# Patient Record
Sex: MALE | Race: WHITE
[De-identification: names, ages, dates, MRNs, and addresses within clinical notes are randomized per-mention and may not be internally consistent; named-entity substitution may affect disease eponyms.]

---

## 2021-08-15 ENCOUNTER — HOSPITAL ENCOUNTER (EMERGENCY)
Dept: HOSPITAL 76 - ED | Age: 51
Discharge: HOME | End: 2021-08-15
Payer: MEDICAID

## 2021-08-15 VITALS — DIASTOLIC BLOOD PRESSURE: 99 MMHG | SYSTOLIC BLOOD PRESSURE: 145 MMHG

## 2021-08-15 DIAGNOSIS — K76.0: Primary | ICD-10-CM

## 2021-08-15 DIAGNOSIS — E86.0: ICD-10-CM

## 2021-08-15 DIAGNOSIS — R07.89: ICD-10-CM

## 2021-08-15 LAB
ALBUMIN DIAFP-MCNC: 5.1 G/DL (ref 3.2–5.5)
ALBUMIN/GLOB SERPL: 2 {RATIO} (ref 1–2.2)
ALP SERPL-CCNC: 115 IU/L (ref 42–121)
ALT SERPL W P-5'-P-CCNC: 268 IU/L (ref 10–60)
ANION GAP SERPL CALCULATED.4IONS-SCNC: 12 MMOL/L (ref 6–13)
AST SERPL W P-5'-P-CCNC: 136 IU/L (ref 10–42)
BASOPHILS NFR BLD AUTO: 0.1 10^3/UL (ref 0–0.1)
BASOPHILS NFR BLD AUTO: 0.7 %
BILIRUB BLD-MCNC: 1 MG/DL (ref 0.2–1)
BUN SERPL-MCNC: 16 MG/DL (ref 6–20)
CALCIUM UR-MCNC: 9.8 MG/DL (ref 8.5–10.3)
CHLORIDE SERPL-SCNC: 105 MMOL/L (ref 101–111)
CO2 SERPL-SCNC: 22 MMOL/L (ref 21–32)
CREAT SERPLBLD-SCNC: 0.9 MG/DL (ref 0.6–1.2)
EOSINOPHIL # BLD AUTO: 0.2 10^3/UL (ref 0–0.7)
EOSINOPHIL NFR BLD AUTO: 2 %
ERYTHROCYTE [DISTWIDTH] IN BLOOD BY AUTOMATED COUNT: 12.2 % (ref 12–15)
GFRSERPLBLD MDRD-ARVRAT: 89 ML/MIN/{1.73_M2} (ref 89–?)
GLOBULIN SER-MCNC: 2.5 G/DL (ref 2.1–4.2)
GLUCOSE SERPL-MCNC: 115 MG/DL (ref 70–100)
HCT VFR BLD AUTO: 47.6 % (ref 42–52)
HGB UR QL STRIP: 16.7 G/DL (ref 14–18)
LIPASE SERPL-CCNC: 33 U/L (ref 22–51)
LYMPHOCYTES # SPEC AUTO: 1.9 10^3/UL (ref 1.5–3.5)
LYMPHOCYTES NFR BLD AUTO: 20.1 %
MCH RBC QN AUTO: 31 PG (ref 27–31)
MCHC RBC AUTO-ENTMCNC: 35.1 G/DL (ref 32–36)
MCV RBC AUTO: 88.5 FL (ref 80–94)
MONOCYTES # BLD AUTO: 0.6 10^3/UL (ref 0–1)
MONOCYTES NFR BLD AUTO: 6.7 %
NEUTROPHILS # BLD AUTO: 6.4 10^3/UL (ref 1.5–6.6)
NEUTROPHILS # SNV AUTO: 9.2 X10^3/UL (ref 4.8–10.8)
NEUTROPHILS NFR BLD AUTO: 70 %
NRBC # BLD AUTO: 0 /100WBC
NRBC # BLD AUTO: 0 X10^3/UL
PDW BLD AUTO: 9.8 FL (ref 7.4–11.4)
PLATELET # BLD: 252 10^3/UL (ref 130–450)
POTASSIUM SERPL-SCNC: 3.9 MMOL/L (ref 3.5–5)
PROT SPEC-MCNC: 7.6 G/DL (ref 6.7–8.2)
RBC MAR: 5.38 10^6/UL (ref 4.7–6.1)
SODIUM SERPLBLD-SCNC: 139 MMOL/L (ref 135–145)

## 2021-08-15 PROCEDURE — 99284 EMERGENCY DEPT VISIT MOD MDM: CPT

## 2021-08-15 PROCEDURE — 96361 HYDRATE IV INFUSION ADD-ON: CPT

## 2021-08-15 PROCEDURE — 93005 ELECTROCARDIOGRAM TRACING: CPT

## 2021-08-15 PROCEDURE — 96374 THER/PROPH/DIAG INJ IV PUSH: CPT

## 2021-08-15 PROCEDURE — 83690 ASSAY OF LIPASE: CPT

## 2021-08-15 PROCEDURE — 36415 COLL VENOUS BLD VENIPUNCTURE: CPT

## 2021-08-15 PROCEDURE — 80053 COMPREHEN METABOLIC PANEL: CPT

## 2021-08-15 PROCEDURE — 84484 ASSAY OF TROPONIN QUANT: CPT

## 2021-08-15 PROCEDURE — 85025 COMPLETE CBC W/AUTO DIFF WBC: CPT

## 2021-08-15 NOTE — XRAY REPORT
PROCEDURE:  Chest 1 View X-Ray

 

INDICATIONS:  Chest pain

 

TECHNIQUE:  One view of the chest was acquired.  

 

COMPARISON:  4/10/2015

 

FINDINGS:  

 

Surgical changes and devices:  None.  

 

Lungs and pleura:  No pleural effusions or pneumothorax.  Lungs are clear.  

 

Mediastinum:  Mediastinal contours appear normal.  Heart size is normal.  

 

Bones and chest wall:  No suspicious bony lesions.  Overlying soft tissues appear unremarkable.  

 

IMPRESSION:  

No acute cardiopulmonary findings

 

Reviewed by: Arthur Cadena MD on 8/15/2021 10:24 AM SERINA

Approved by: Arthur Cadena MD on 8/15/2021 10:24 AM AKCODY

 

 

Station ID:  [id number]

## 2021-08-15 NOTE — ED PHYSICIAN DOCUMENTATION
PD HPI CHEST PAIN





- Stated complaint


Stated Complaint: CHEST PX





- Chief complaint


Chief Complaint: Cardiac





- History obtained from


History obtained from: Patient, Family





- History of Present Illness


Timing - onset: How many days ago (6)


Timing - onset during: Rest


Timing - duration: Days (6)


Timing - details: Abrupt onset, Now resolved, Waxing and waning


Quality: Sharp, Pain


Location: Left chest, Upper back


Improved by: Other (ibuprofen)


Worsened by: Palpation, Position


Associated symptoms: Other (anxious).  No: Shortness of air, Diaphoresis, 

Nausea, Vomiting, Feeling faint / dizzy, General Weakness, Palpitations, Cough


Similar symptoms before: Diagnosis (costochondritis)


Recently seen: Not recently seen





- Additional information


Additional information: 





Appears well 51-year-old male with a history of anxiety and costochondritis has 

developed chest pain again.  He states that he tends to get pretty excited when 

he has pain in the left side of his chest.  First thing he talks about his a 

story when he was 10 years old of going to the hospital and having what sounds 

like a stress test done.  The patient relates that 6 days ago he began to have 

issues with pain in the left chest with episodic sharp pain and this was 

improved by the use of ibuprofen.  He does state that he usually walks 4 miles 

per day he has not had a change in his exercise tolerance.He has not been ill 

recently and has no exposure to Covid.  He is unimmunized.





Review of Systems


Constitutional: denies: Fever


Eyes: denies: Decreased vision


Ears: denies: Ear pain


Nose: denies: Congestion


Throat: denies: Sore throat


Cardiac: reports: Chest pain / pressure.  denies: Palpitations


Respiratory: denies: Dyspnea, Cough


GI: denies: Nausea, Vomiting, Diarrhea


: denies: Dysuria, Frequency





PD PAST MEDICAL HISTORY





- Past Medical History


Cardiovascular: High cholesterol





- Past Surgical History


Past Surgical History: Yes


General: Other





- Present Medications


Home Medications: 


                                Ambulatory Orders











 Medication  Instructions  Recorded  Confirmed


 


Atorvastatin [Lipitor] 10 mg PO DAILY 03/07/15 11/06/15


 


HYDROcod/ACETAM 5/325 [Norco 5/325] 1 - 2 ea PO Q6H PRN #15 tablet 11/16/15 


 


Hydrocortisone Acetate [Anusol-Hc] 25 mg RC BID PRN #20 supp.rect 12/03/15 


 


Ciprofloxacin HCl [Cipro] 500 mg PO BID #14 tablet 12/21/15 


 


metroNIDAZOLE [Flagyl] 500 mg PO TID #20 tablet 12/21/15 














- Allergies


Allergies/Adverse Reactions: 


                                    Allergies











Allergy/AdvReac Type Severity Reaction Status Date / Time


 


Penicillins Allergy Intermediate Rash Verified 08/15/21 11:02


 


buspirone HCl * [From BuSpar] AdvReac Severe Emesis Verified 08/15/21 11:02


 


cephalexin monohydrate * AdvReac Severe Emesis Verified 08/15/21 11:02





[From Keflex]     














- Social History


Does the pt smoke?: No


Smoking Status: Never smoker


Does the pt drink ETOH?: No


Does the pt have substance abuse?: No





- Immunizations


Immunizations are current?: Yes





- POLST


Patient has POLST: No





PD ED PE NORMAL





- Vitals


Vital signs reviewed: Yes (Tachycardic and hypertensive)





- General


General: Alert and oriented X 3, Well developed/nourished, Other (Anxious 

appearing male wearing 3 masks)





- HEENT


HEENT: Atraumatic, PERRL, EOMI





- Neck


Neck: Supple, no meningeal sign, No bony TTP





- Cardiac


Cardiac: RRR, No murmur





- Respiratory


Respiratory: No respiratory distress, Clear bilaterally, Other (There is chest 

wall tenderness to the lateral chest wall and to the posterior chest wall over 

the rhomboid muscles on the left side.)





- Abdomen


Abdomen: Soft, Non tender





- Back


Back: No CVA TTP, No spinal TTP





- Derm


Derm: Normal color, Warm and dry, No rash





- Extremities


Extremities: No deformity, No edema





- Neuro


Neuro: Alert and oriented X 3, CNs 2-12 intact, No motor deficit, No sensory 

deficit, Normal speech


Eye Opening: Spontaneous


Motor: Obeys Commands


Verbal: Oriented


GCS Score: 15





- Psych


Psych: Normal mood, Normal affect





Results





- Vitals


Vitals: 


                               Vital Signs - 24 hr











  08/15/21 08/15/21





  10:58 11:36


 


Temperature 36.0 C L 36.5 C


 


Heart Rate 106 H 99


 


Respiratory 21 16





Rate  


 


Blood Pressure 163/122 H 174/100 H


 


O2 Saturation 97 96








                                     Oxygen











O2 Source                      Room air

















- EKG (time done)


  ** 1054


Rate: Rate (enter#) (99)


Rhythm: NSR


Ischemia: Q waves


Other comments: Other comments (RV)


Compare to prior EKG: Unchanged from prior EKG (SPT 4- no changes)


Computer interpretation: Agree with computer





- Labs


Labs: 


                                Laboratory Tests











  08/15/21 08/15/21 08/15/21





  11:15 11:15 11:15


 


WBC  9.2  


 


RBC  5.38  


 


Hgb  16.7  


 


Hct  47.6  


 


MCV  88.5  


 


MCH  31.0  


 


MCHC  35.1  


 


RDW  12.2  


 


Plt Count  252  


 


MPV  9.8  


 


Neut # (Auto)  6.4  


 


Lymph # (Auto)  1.9  


 


Mono # (Auto)  0.6  


 


Eos # (Auto)  0.2  


 


Baso # (Auto)  0.1  


 


Absolute Nucleated RBC  0.00  


 


Nucleated RBC %  0.0  


 


Sodium   139 


 


Potassium   3.9 


 


Chloride   105 


 


Carbon Dioxide   22 


 


Anion Gap   12.0 


 


BUN   16 


 


Creatinine   0.9 


 


Estimated GFR (MDRD)   89 


 


Glucose   115 H 


 


Calcium   9.8 


 


Total Bilirubin   1.0 


 


AST   136 H 


 


ALT   268 H 


 


Alkaline Phosphatase   115 


 


Troponin I High Sens    4.1


 


Total Protein   7.6 


 


Albumin   5.1 


 


Globulin   2.5 


 


Albumin/Globulin Ratio   2.0 


 


Lipase   33 














- Rads (name of study)


  ** Chest


Radiology: Prelim report reviewed (Impression: No acute cardiopulmonary 

findings.), EMP read indepedently, See rad report





Procedures





- IVC sono (time)


  ** 1130


Bedside IVC sono: IVC measures (cm) (0.74), Dehydration (est 1-2 liter deficit)





PD MEDICAL DECISION MAKING





- ED course


Complexity details: reviewed results, re-evaluated patient, considered 

differential, d/w patient, d/w family


ED course: 





51-year-old male with chest wall pain is anxious has unchanged electrocardiogram

and he is found to be dehydrated on interrogation the inferior vena cava.  He is

administered intravenous saline and dexamethasone.





Departure





- Departure


Disposition: 01 Home, Self Care


Clinical Impression: 


 Fatty liver, Chest wall pain, Dehydration





Condition: Stable


Instructions:  ED Chest Pain Costochondritis, ED Dehydration, NAFLD


Follow-Up: 


 [facility] [Provider Group]

## 2022-10-11 ENCOUNTER — HOSPITAL ENCOUNTER (EMERGENCY)
Dept: HOSPITAL 76 - ED | Age: 52
Discharge: HOME | End: 2022-10-11
Payer: MEDICAID

## 2022-10-11 VITALS — DIASTOLIC BLOOD PRESSURE: 96 MMHG | SYSTOLIC BLOOD PRESSURE: 156 MMHG

## 2022-10-11 DIAGNOSIS — M54.41: Primary | ICD-10-CM

## 2022-10-11 PROCEDURE — 96372 THER/PROPH/DIAG INJ SC/IM: CPT

## 2022-10-11 PROCEDURE — 99283 EMERGENCY DEPT VISIT LOW MDM: CPT

## 2022-10-11 NOTE — EXTERNAL MEDICAL SUMMARY RPT
Continuity of Care Document

                           Created on:2022



Patient:ANNITA JEONG

Sex:Male

:1970

External Reference #:[id number]





Demographics







                          Address                   [address] [address]



                                                    [place] [address]

 

                          Phone                     Unavailable

 

                          Preferred Language        English

 

                          Marital Status            Never 

 

                          Catholic Affiliation     Unknown

 

                          Race                      Unknown

 

                          Ethnic Group              Unknown









Author







                          Organization              [facility]

 

                          Address                   [address] [place] [address]

 

                          Phone                     [phone]









Allergies and Intolerances







                 date            description     facility        type

 

                 (no date)       [facility]  (unknown)







Encounters

No information.



Functional Status

No information.



Immunizations

No information.



Medications







                     date                description         facility

 

                       hydrocortisone acetate 25 MG/ML / Pram

oxine  [facility]



                                        hydrochloride 10 MG/ML Rectal Cream 







Problems

No information.



Procedures







                     date                description         facility

 

                       Visit Code Hold     All

 

                       [facility]







Results/Labs







           test      date      author    facility  value     unit      interpret

ation









                                         Result panel 1









           (unknown)  (no       (unknown)  (unknown)  (no value)  (units    (unk

nown)



                    date)                                   unknown)  

 

           (unknown)  (no       (unknown)  (unknown)  (no value)  (units    (unk

nown)



                    date)                                   unknown)  

 

           (unknown)  (no       (unknown)  (unknown)  [facility]  (units  

  (unknown)



                    date)                         Medicine  unknown)  

 

           (unknown)  (no       (unknown)  (unknown)  [place]  (units    (

unknown)



                    date)                         [id number]     unknown)  

 

           (unknown)  (no       (unknown)  (unknown)  [facility]     (units    (unkno

wn)



                    date)                                   unknown)  

 

           (unknown)  (no       (unknown)  (unknown)  Family Practice  (units   

 (unknown)



                    date)                         Office Visit unknown)  

 

           (unknown)  (no       (unknown)  (unknown)  HIVES     (units    (unkno

wn)



                    date)                                   unknown)  

 

           (unknown)  (no       (unknown)  (unknown)  NAUSEA    (units    (unkno

wn)



                    date)                                   unknown)  

 

           (unknown)  (no       (unknown)  (unknown)  (no value)  (units    (unk

nown)



                    date)                                   unknown)  

 

           (unknown)  (no       (unknown)  (unknown)  22  (units    (unkno

wn)



                    date)                                   unknown)  

 

           (unknown)  (no       (unknown)  (unknown)  1313      (units    (unkno

wn)



                    date)                                   unknown)  

 

           (unknown)  (no       (unknown)  (unknown)  [id number]    (units    (unkno

wn)



                    date)                                   unknown)  

 

           (unknown)  (no       (unknown)  (unknown)  Age/Sex: 52 / M  (units   

 (unknown)



                    date)                         Date of Service: unknown)  

 

           (unknown)  (no       (unknown)  (unknown)  Allergies  (units    (unkn

own)



                    date)                                   unknown)  

 

           (unknown)  (no       (unknown)  (unknown)  Attending Dr:  (units    (

unknown)



                    date)                         Zhanna Polo unknown)  



                                                  ANGELA               

 

           (unknown)  (no       (unknown)  (unknown)  : 1970  (units   

 (unknown)



                    date)                         Acct:[id number] unknown)  

 

           (unknown)  (no       (unknown)  (unknown)  Dept at   (units    (unkno

wn)



                    date)                         [phone] unknown)  

 

           (unknown)  (no       (unknown)  (unknown)  Documented By:  (units    

(unknown)



                    date)                         Zhanna Polo unknown)  



                                                  ANGELA 22           

 

           (unknown)  (no       (unknown)  (unknown)  Family History  (units    

(unknown)



                    date)                         (Reviewed 02/15/21 unknown)  



                                                  @ 18:17 by JAN Luevano)           

 

           (unknown)  (no       (unknown)  (unknown)  Father Lipids  (units    (

unknown)



                    date)                         abnormal  unknown)  

 

           (unknown)  (no       (unknown)  (unknown)  Intake    (units    (unkno

wn)



                    date)                                   unknown)  

 

           (unknown)  (no       (unknown)  (unknown)  Intake Note:  (units    (u

nknown)



                    date)                                   unknown)  

 

           (unknown)  (no       (unknown)  (unknown)  Intake performed  (units  

  (unknown)



                    date)                         by: Zia Miner unknown)  

 

           (unknown)  (no       (unknown)  (unknown)  Intake- Clincial  (units  

  (unknown)



                    date)                         Staff     unknown)  

 

           (unknown)  (no       (unknown)  (unknown)  Loc: [facility]  (units    (unkno

wn)



                    date)                                   unknown)  

 

           (unknown)  (no       (unknown)  (unknown)  Medical History  (units   

 (unknown)



                    date)                         (Reviewed 02/15/21 unknown)  



                                                  @ 18:17 by JAN Luevano)           

 

           (unknown)  (no       (unknown)  (unknown)  No significant  (units    

(unknown)



                    date)                         medical problems unknown)  

 

           (unknown)  (no       (unknown)  (unknown)  PFSH      (units    (unkno

wn)



                    date)                                   unknown)  

 

           (unknown)  (no       (unknown)  (unknown)  Patient:  (units    (unkno

wn)



                    date)                         Annita Jeong W unknown)  



                                                  MR#: [id number]           

 

           (unknown)  (no       (unknown)  (unknown)  Pt presents with  (units  

  (unknown)



                    date)                         painful   unknown)  



                                                  hemorrhoids.           

 

           (unknown)  (no       (unknown)  (unknown)  Reason For Visit  (units  

  (unknown)



                    date)                                   unknown)  

 

           (unknown)  (no       (unknown)  (unknown)  Signed By:  (units    (unk

nown)



                    date)                                   unknown)  

 

           (unknown)  (no       (unknown)  (unknown)  Smoking Status:  (units   

 (unknown)



                    date)                         Never smoker unknown)  

 

           (unknown)  (no       (unknown)  (unknown)  This note may  (units    (

unknown)



                    date)                         have been all or unknown)  



                                                  partially           



                                                  generated using           



                                                  voice recognition           

 

           (unknown)  (no       (unknown)  (unknown)  Tobacco +  (units    (unkn

own)



                    date)                         Substance Use unknown)  

 

           (unknown)  (no       (unknown)  (unknown)  Tobacco Status  (units    

(unknown)



                    date)                                   unknown)  

 

           (unknown)  (no       (unknown)  (unknown)  Visit Reasons:  (units    

(unknown)



                    date)                         Sore hemorrhoids unknown)  

 

           (unknown)  (no       (unknown)  (unknown)  buspirone Allergy  (units 

   (unknown)



                    date)                         (Unknown, unknown)  



                                                  Unverified           



                                                  02/15/21 17:59)           

 

           (unknown)  (no       (unknown)  (unknown)  cyclobenzaprine  (units   

 (unknown)



                    date)                         Allergy (Mild, unknown)  



                                                  Unverified           



                                                  02/15/21 17:59)           

 

           (unknown)  (no       (unknown)  (unknown)  have occurred. If  (units 

   (unknown)



                    date)                         there are any unknown)  



                                                  questions, please           



                                                  contact the           



                                                  Medical Records           

 

           (unknown)  (no       (unknown)  (unknown)  may occur.  (units    (unk

nown)



                    date)                         Occasional unknown)  



                                                  wrong-word or           



                                                  'sound-alike'           



                                                  substitutions may           



                                                  have                

 

           (unknown)  (no       (unknown)  (unknown)  occurred due to  (units   

 (unknown)



                    date)                         the inherent unknown)  



                                                  limitations of           



                                                  voice recognition           



                                                  software. Please           

 

           (unknown)  (no       (unknown)  (unknown)  penicillin G  (units    (u

nknown)



                    date)                         Allergy (Mild, unknown)  



                                                  Unverified           



                                                  02/15/21 17:59)           

 

           (unknown)  (no       (unknown)  (unknown)  read the note  (units    (

unknown)



                    date)                         carefully and unknown)  



                                                  recognize, using           



                                                  context, where           



                                                  these               



                                                  substitutions           

 

           (unknown)  (no       (unknown)  (unknown)  software.  (units    (unkn

own)



                    date)                         Although every unknown)  



                                                  effort is made to           



                                                  edit content,           



                                                  transcription           



                                                  errors              









                                         Result panel 2









           (unknown)  (no       (unknown)  (unknown)  (no value)  (units    (unk

nown)



                    date)                                   unknown)  

 

           (unknown)  (no       (unknown)  (unknown)  (no value)  (units    (unk

nown)



                    date)                                   unknown)  

 

           (unknown)  (no       (unknown)  (unknown)  (no value)  (units    (unk

nown)



                    date)                                   unknown)  

 

           (unknown)  (no       (unknown)  (unknown)  22  (units    (unkno

wn)



                    date)                                   unknown)  

 

           (unknown)  (no       (unknown)  (unknown)  13:17     (units    (unkno

wn)



                    date)                                   unknown)  

 

           (unknown)  (no       (unknown)  (unknown)  [facility]  (units  

  (unknown)



                    date)                         Medicine  unknown)  

 

           (unknown)  (no       (unknown)  (unknown)  [place]  (units    (

unknown)



                    date)                         [id number]     unknown)  

 

           (unknown)  (no       (unknown)  (unknown)  [facility]     (units    (unkno

wn)



                    date)                                   unknown)  

 

           (unknown)  (no       (unknown)  (unknown)  Family Practice  (units   

 (unknown)



                    date)                         Office Visit unknown)  

 

           (unknown)  (no       (unknown)  (unknown)  HIVES     (units    (unkno

wn)



                    date)                                   unknown)  

 

           (unknown)  (no       (unknown)  (unknown)  NAUSEA    (units    (unkno

wn)



                    date)                                   unknown)  

 

           (unknown)  (no       (unknown)  (unknown)  (no value)  (units    (unk

nown)



                    date)                                   unknown)  

 

           (unknown)  (no       (unknown)  (unknown)  22  (units    (unkno

wn)



                    date)                                   unknown)  

 

           (unknown)  (no       (unknown)  (unknown)  1313      (units    (unkno

wn)



                    date)                                   unknown)  

 

           (unknown)  (no       (unknown)  (unknown)  [id number]    (units    (unkno

wn)



                    date)                                   unknown)  

 

           (unknown)  (no       (unknown)  (unknown)  Age/Sex: 52 / M  (units   

 (unknown)



                    date)                         Date of Service: unknown)  

 

           (unknown)  (no       (unknown)  (unknown)  Allergies  (units    (unkn

own)



                    date)                                   unknown)  

 

           (unknown)  (no       (unknown)  (unknown)  Attending Dr:  (units    (

unknown)



                    date)                         Zhanna Polo unknown)  



                                                  PLavonneARICHARD               

 

           (unknown)  (no       (unknown)  (unknown)  BMI 31.8  (units    (unkno

wn)



                    date)                                   unknown)  

 

           (unknown)  (no       (unknown)  (unknown)  /90  (units    (unkn

own)



                    date)                                   unknown)  

 

           (unknown)  (no       (unknown)  (unknown)  Blood Pressure  (units    

(unknown)



                    date)                         Location Lt unknown)  



                                                  brachial            

 

           (unknown)  (no       (unknown)  (unknown)  : 1970  (units   

 (unknown)



                    date)                         Acct:[id number] unknown)  

 

           (unknown)  (no       (unknown)  (unknown)  Dept at   (units    (unkno

wn)



                    date)                         [phone] unknown)  

 

           (unknown)  (no       (unknown)  (unknown)  Documented By:  (units    

(unknown)



                    date)                         Zhanna Polo unknownCecilia MILLER 22           

 

           (unknown)  (no       (unknown)  (unknown)  Family History  (units    

(unknown)



                    date)                         (Reviewed 02/15/21 unknown)  



                                                  @ 18:17 by JAN Luevano)           

 

           (unknown)  (no       (unknown)  (unknown)  Father Lipids  (units    (

unknown)



                    date)                         abnormal  unknown)  

 

           (unknown)  (no       (unknown)  (unknown)  Height 5 ft 9 in  (units  

  (unknown)



                    date)                                   unknown)  

 

           (unknown)  (no       (unknown)  (unknown)  Intake    (units    (unkno

wn)



                    date)                                   unknown)  

 

           (unknown)  (no       (unknown)  (unknown)  Intake Note:  (units    (u

nknown)



                    date)                                   unknown)  

 

           (unknown)  (no       (unknown)  (unknown)  Intake performed  (units  

  (unknown)



                    date)                         by: Zia Miner unknown)  

 

           (unknown)  (no       (unknown)  (unknown)  Intake- Clincial  (units  

  (unknown)



                    date)                         Staff     unknown)  

 

           (unknown)  (no       (unknown)  (unknown)  Loc: [facility]  (units    (unkno

wn)



                    date)                                   unknown)  

 

           (unknown)  (no       (unknown)  (unknown)  Medical History  (units   

 (unknown)



                    date)                         (Reviewed 02/15/21 unknown)  



                                                  @ 18:17 by JAN Luevano)           

 

           (unknown)  (no       (unknown)  (unknown)  Medications  (units    (un

known)



                    date)                                   unknown)  

 

           (unknown)  (no       (unknown)  (unknown)  No Known Home  (units    (

unknown)



                    date)                         Medications unknown)  



                                                  02/15/21 [History           



                                                  Confirmed           



                                                  22]           

 

           (unknown)  (no       (unknown)  (unknown)  No significant  (units    

(unknown)



                    date)                         medical problems unknown)  

 

           (unknown)  (no       (unknown)  (unknown)  Oxygen Delivery  (units   

 (unknown)



                    date)                         Method room air unknown)  

 

           (unknown)  (no       (unknown)  (unknown)  PFSH      (units    (unkno

wn)



                    date)                                   unknown)  

 

           (unknown)  (no       (unknown)  (unknown)  Patient:  (units    (unkno

wn)



                    date)                         Annita Jeong W unknown)  



                                                  MR#: [id number]           

 

           (unknown)  (no       (unknown)  (unknown)  Position Standing  (units 

   (unknown)



                    date)                                   unknown)  

 

           (unknown)  (no       (unknown)  (unknown)  Pt presents with  (units  

  (unknown)



                    date)                         painful   unknown)  



                                                  hemorrhoids for           



                                                  about 2 weeks.           

 

           (unknown)  (no       (unknown)  (unknown)  Pulse 106 H  (units    (un

known)



                    date)                                   unknown)  

 

           (unknown)  (no       (unknown)  (unknown)  Pulse Oximetry  (units    

(unknown)



                    date)                         (%) 99    unknown)  

 

           (unknown)  (no       (unknown)  (unknown)  Pulse Source  (units    (u

nknown)



                    date)                         Monitor   unknown)  

 

           (unknown)  (no       (unknown)  (unknown)  Reason For Visit  (units  

  (unknown)



                    date)                                   unknown)  

 

           (unknown)  (no       (unknown)  (unknown)  Respiration 14  (units    

(unknown)



                    date)                                   unknown)  

 

           (unknown)  (no       (unknown)  (unknown)  Signed By:  (units    (unk

nown)



                    date)                                   unknown)  

 

           (unknown)  (no       (unknown)  (unknown)  Smoking Status:  (units   

 (unknown)



                    date)                         Never smoker unknown)  

 

           (unknown)  (no       (unknown)  (unknown)  Temp 96.9 F L  (units    (

unknown)



                    date)                                   unknown)  

 

           (unknown)  (no       (unknown)  (unknown)  Temp Source Skin  (units  

  (unknown)



                    date)                                   unknown)  

 

           (unknown)  (no       (unknown)  (unknown)  This note may  (units    (

unknown)



                    date)                         have been all or unknown)  



                                                  partially           



                                                  generated using           



                                                  voice recognition           

 

           (unknown)  (no       (unknown)  (unknown)  Tobacco +  (units    (unkn

own)



                    date)                         Substance Use unknown)  

 

           (unknown)  (no       (unknown)  (unknown)  Tobacco Status  (units    

(unknown)



                    date)                                   unknown)  

 

           (unknown)  (no       (unknown)  (unknown)  Visit Reasons:  (units    

(unknown)



                    date)                         Sore hemorrhoids unknown)  

 

           (unknown)  (no       (unknown)  (unknown)  Vitals    (units    (unkno

wn)



                    date)                                   unknown)  

 

           (unknown)  (no       (unknown)  (unknown)  Weight 216 lb  (units    (

unknown)



                    date)                                   unknown)  

 

           (unknown)  (no       (unknown)  (unknown)  buspirone Allergy  (units 

   (unknown)



                    date)                         (Unknown, unknown)  



                                                  Unverified           



                                                  22 13:17)           

 

           (unknown)  (no       (unknown)  (unknown)  cyclobenzaprine  (units   

 (unknown)



                    date)                         Allergy (Mild, unknown)  



                                                  Unverified           



                                                  22 13:17)           

 

           (unknown)  (no       (unknown)  (unknown)  have occurred. If  (units 

   (unknown)



                    date)                         there are any unknown)  



                                                  questions, please           



                                                  contact the           



                                                  Medical Records           

 

           (unknown)  (no       (unknown)  (unknown)  may occur.  (units    (unk

nown)



                    date)                         Occasional unknown)  



                                                  wrong-word or           



                                                  'sound-alike'           



                                                  substitutions may           



                                                  have                

 

           (unknown)  (no       (unknown)  (unknown)  occurred due to  (units   

 (unknown)



                    date)                         the inherent unknown)  



                                                  limitations of           



                                                  voice recognition           



                                                  software. Please           

 

           (unknown)  (no       (unknown)  (unknown)  penicillin G  (units    (u

nknown)



                    date)                         Allergy (Mild, unknown)  



                                                  Unverified           



                                                  22 13:17)           

 

           (unknown)  (no       (unknown)  (unknown)  read the note  (units    (

unknown)



                    date)                         carefully and unknown)  



                                                  recognize, using           



                                                  context, where           



                                                  these               



                                                  substitutions           

 

           (unknown)  (no       (unknown)  (unknown)  software.  (units    (unkn

own)



                    date)                         Although every unknown)  



                                                  effort is made to           



                                                  edit content,           



                                                  transcription           



                                                  errors              









                                         Result panel 3









           (unknown)  (no       (unknown)  (unknown)  (no value)  (units    (unk

nown)



                    date)                                   unknown)  

 

           (unknown)  (no       (unknown)  (unknown)  Medications:  (units    (u

nknown)



                    date)                                   unknown)  

 

           (unknown)  (no       (unknown)  (unknown)  (no value)  (units    (unk

nown)



                    date)                                   unknown)  

 

           (unknown)  (no       (unknown)  (unknown)  (no value)  (units    (unk

nown)



                    date)                                   unknown)  

 

           (unknown)  (no       (unknown)  (unknown)  22  (units    (unkno

wn)



                    date)                                   unknown)  

 

           (unknown)  (no       (unknown)  (unknown)  13:17     (units    (unkno

wn)



                    date)                                   unknown)  

 

           (unknown)  (no       (unknown)  (unknown)  [facility]  (units  

  (unknown)



                    date)                         Medicine  unknown)  

 

           (unknown)  (no       (unknown)  (unknown)  [place]  (units    (

unknown)



                    date)                         [id number]     unknown)  

 

           (unknown)  (no       (unknown)  (unknown)  [employer]     (units    (unkno

wn)



                    date)                                   unknown)  

 

           (unknown)  (no       (unknown)  (unknown)  Family Practice  (units   

 (unknown)



                    date)                         Office Visit unknown)  

 

           (unknown)  (no       (unknown)  (unknown)  HIVES     (units    (unkno

wn)



                    date)                                   unknown)  

 

           (unknown)  (no       (unknown)  (unknown)  NAUSEA    (units    (unkno

wn)



                    date)                                   unknown)  

 

           (unknown)  (no       (unknown)  (unknown)  apply up to 4x  (units    

(unknown)



                    date)                         daily as needed 1 unknown)  



                                                  supp MT .prn 12 ea           



                                                  0RF                 

 

           (unknown)  (no       (unknown)  (unknown)  do not use for  (units    

(unknown)



                    date)                         more than 7 days 1 unknown)  



                                                  applic MT BID PRN           



                                                  30 grams 0RF           



                                                  hemorrhoids           

 

           (unknown)  (no       (unknown)  (unknown)  (no value)  (units    (unk

nown)



                    date)                                   unknown)  

 

           (unknown)  (no       (unknown)  (unknown)  #30 grams  (units    (unkn

own)



                    date)                         22 [Rx unknown)  



                                                  Confirmed           



                                                  22]           

 

           (unknown)  (no       (unknown)  (unknown)  22  (units    (unkno

wn)



                    date)                                   unknown)  

 

           (unknown)  (no       (unknown)  (unknown)  22 [Rx  (units    (u

nknown)



                    date)                         Confirmed unknown)  



                                                  22]           

 

           (unknown)  (no       (unknown)  (unknown)  1313      (units    (unkno

wn)



                    date)                                   unknown)  

 

           (unknown)  (no       (unknown)  (unknown)  [id number]    (units    (unkno

wn)



                    date)                                   unknown)  

 

           (unknown)  (no       (unknown)  (unknown)  Age/Sex: 52 / M  (units   

 (unknown)



                    date)                         Date of Service: unknown)  

 

           (unknown)  (no       (unknown)  (unknown)  Allergies  (units    (unkn

own)



                    date)                                   unknown)  

 

           (unknown)  (no       (unknown)  (unknown)  Assessment + Plan  (units 

   (unknown)



                    date)                                   unknown)  

 

           (unknown)  (no       (unknown)  (unknown)  Attending Dr:  (units    (

unknown)



                    date)                         Zhanna Polo unknown)  



                                                  [place]               

 

           (unknown)  (no       (unknown)  (unknown)  BMI 31.8  (units    (unkno

wn)



                    date)                                   unknown)  

 

           (unknown)  (no       (unknown)  (unknown)  /90  (units    (unkn

own)



                    date)                                   unknown)  

 

           (unknown)  (no       (unknown)  (unknown)  Blood Pressure  (units    

(unknown)



                    date)                         Location Lt unknown)  



                                                  brachial            

 

           (unknown)  (no       (unknown)  (unknown)  : 1970  (units   

 (unknown)



                    date)                         Acct:[id number] unknown)  

 

           (unknown)  (no       (unknown)  (unknown)  Dept at   (units    (unkno

wn)



                    date)                         [phone]. unknown)  

 

           (unknown)  (no       (unknown)  (unknown)  Documented By:  (units    

(unknown)



                    date)                         Zhanna Polo unknown)  



                                                  [place] 22           

 

           (unknown)  (no       (unknown)  (unknown)  Family History  (units    

(unknown)



                    date)                         (Reviewed 02/15/21 unknown)  



                                                  @ 18:17 by JAN Luevano)           

 

           (unknown)  (no       (unknown)  (unknown)  Father Lipids  (units    (

unknown)



                    date)                         abnormal  unknown)  

 

           (unknown)  (no       (unknown)  (unknown)  Height 5 ft 9 in  (units  

  (unknown)



                    date)                                   unknown)  

 

           (unknown)  (no       (unknown)  (unknown)  Intake    (units    (unkno

wn)



                    date)                                   unknown)  

 

           (unknown)  (no       (unknown)  (unknown)  Intake Note:  (units    (u

nknown)



                    date)                                   unknown)  

 

           (unknown)  (no       (unknown)  (unknown)  Intake performed  (units  

  (unknown)



                    date)                         by: Zia Miner unknown)  

 

           (unknown)  (no       (unknown)  (unknown)  Intake- Clincial  (units  

  (unknown)



                    date)                         Staff     unknown)  

 

           (unknown)  (no       (unknown)  (unknown)  Loc: [facility]  (units    (unkno

wn)



                    date)                                   unknown)  

 

           (unknown)  (no       (unknown)  (unknown)  Medical History  (units   

 (unknown)



                    date)                         (Reviewed 02/15/21 unknown)  



                                                  @ 18:17 by JAN Luevano)           

 

           (unknown)  (no       (unknown)  (unknown)  Medications  (units    (un

known)



                    date)                                   unknown)  

 

           (unknown)  (no       (unknown)  (unknown)  New       (units    (unkno

wn)



                    date)                                   unknown)  

 

           (unknown)  (no       (unknown)  (unknown)  No significant  (units    

(unknown)



                    date)                         medical problems unknown)  

 

           (unknown)  (no       (unknown)  (unknown)  Oxygen Delivery  (units   

 (unknown)



                    date)                         Method room air unknown)  

 

           (unknown)  (no       (unknown)  (unknown)  PFSH      (units    (unkno

wn)



                    date)                                   unknown)  

 

           (unknown)  (no       (unknown)  (unknown)  Patient:  (units    (unkno

wn)



                    date)                         Annita Jeong W unknown)  



                                                  MR#: [id number]           

 

           (unknown)  (no       (unknown)  (unknown)  Position Standing  (units 

   (unknown)



                    date)                                   unknown)  

 

           (unknown)  (no       (unknown)  (unknown)  Pt presents with  (units  

  (unknown)



                    date)                         painful   unknown)  



                                                  hemorrhoids for           



                                                  about 2 weeks.           

 

           (unknown)  (no       (unknown)  (unknown)  Pulse 106 H  (units    (un

known)



                    date)                                   unknown)  

 

           (unknown)  (no       (unknown)  (unknown)  Pulse Oximetry  (units    

(unknown)



                    date)                         (%) 99    unknown)  

 

           (unknown)  (no       (unknown)  (unknown)  Pulse Source  (units    (u

nknown)



                    date)                         Monitor   unknown)  

 

           (unknown)  (no       (unknown)  (unknown)  Reason For Visit  (units  

  (unknown)



                    date)                                   unknown)  

 

           (unknown)  (no       (unknown)  (unknown)  Respiration 14  (units    

(unknown)



                    date)                                   unknown)  

 

           (unknown)  (no       (unknown)  (unknown)  Signed By:  (units    (unk

nown)



                    date)                                   unknown)  

 

           (unknown)  (no       (unknown)  (unknown)  Smoking Status:  (units   

 (unknown)



                    date)                         Never smoker unknown)  

 

           (unknown)  (no       (unknown)  (unknown)  Temp 96.9 F L  (units    (

unknown)



                    date)                                   unknown)  

 

           (unknown)  (no       (unknown)  (unknown)  Temp Source Skin  (units  

  (unknown)



                    date)                                   unknown)  

 

           (unknown)  (no       (unknown)  (unknown)  This note may  (units    (

unknown)



                    date)                         have been all or unknown)  



                                                  partially           



                                                  generated using           



                                                  voice recognition           

 

           (unknown)  (no       (unknown)  (unknown)  Tobacco +  (units    (unkn

own)



                    date)                         Substance Use unknown)  

 

           (unknown)  (no       (unknown)  (unknown)  Tobacco Status  (units    

(unknown)



                    date)                                   unknown)  

 

           (unknown)  (no       (unknown)  (unknown)  Visit Reasons:  (units    

(unknown)



                    date)                         Sore hemorrhoids unknown)  

 

           (unknown)  (no       (unknown)  (unknown)  Vitals    (units    (unkno

wn)



                    date)                                   unknown)  

 

           (unknown)  (no       (unknown)  (unknown)  Weight 216 lb  (units    (

unknown)



                    date)                                   unknown)  

 

           (unknown)  (no       (unknown)  (unknown)  buspirone Allergy  (units 

   (unknown)



                    date)                         (Unknown, unknown)  



                                                  Unverified           



                                                  22 13:17)           

 

           (unknown)  (no       (unknown)  (unknown)  cyclobenzaprine  (units   

 (unknown)



                    date)                         Allergy (Mild, unknown)  



                                                  Unverified           



                                                  22 13:17)           

 

           (unknown)  (no       (unknown)  (unknown)  have occurred. If  (units 

   (unknown)



                    date)                         there are any unknown)  



                                                  questions, please           



                                                  contact the           



                                                  Medical Records           

 

           (unknown)  (no       (unknown)  (unknown)  hydrocortisone-pr  (units 

   (unknown)



                    date)                         amoxine 2.5 %-1 % unknown)  



                                                  rectal cream 1           



                                                  applic MT BID PRN           



                                                  hemorrhoids           

 

           (unknown)  (no       (unknown)  (unknown)  hydrocortisone-pr  (units 

   (unknown)



                    date)                         amoxine 2.5-1 % unknown)  

 

           (unknown)  (no       (unknown)  (unknown)  may occur.  (units    (unk

nown)



                    date)                         Occasional unknown)  



                                                  wrong-word or           



                                                  'sound-alike'           



                                                  substitutions may           



                                                  have                

 

           (unknown)  (no       (unknown)  (unknown)  occurred due to  (units   

 (unknown)



                    date)                         the inherent unknown)  



                                                  limitations of           



                                                  voice recognition           



                                                  software. Please           

 

           (unknown)  (no       (unknown)  (unknown)  penicillin G  (units    (u

nknown)



                    date)                         Allergy (Mild, unknown)  



                                                  Unverified           



                                                  22 13:17)           

 

           (unknown)  (no       (unknown)  (unknown)  phenylephrine  (units    (

unknown)



                    date)                         0.25 %-hard fat unknown)  



                                                  88.7 % rectal           



                                                  suppository 1 supp           



                                                  MT .prn #12 ea           

 

           (unknown)  (no       (unknown)  (unknown)  phenylephrine-pola  (units 

   (unknown)



                    date)                         d fat 0.25-88.7 % unknown)  

 

           (unknown)  (no       (unknown)  (unknown)  read the note  (units    (

unknown)



                    date)                         carefully and unknown)  



                                                  recognize, using           



                                                  context, where           



                                                  these               



                                                  substitutions           

 

           (unknown)  (no       (unknown)  (unknown)  software.  (units    (unkn

own)



                    date)                         Although every unknown)  



                                                  effort is made to           



                                                  edit content,           



                                                  transcription           



                                                  errors              









                                         Result panel 4









           (unknown)  (no       (unknown)  (unknown)  (no value)  (units    (unk

nown)



                    date)                                   unknown)  

 

           (unknown)  (no       (unknown)  (unknown)  Medications:  (units    (u

nknown)



                    date)                                   unknown)  

 

           (unknown)  (no       (unknown)  (unknown)  (no value)  (units    (unk

nown)



                    date)                                   unknown)  

 

           (unknown)  (no       (unknown)  (unknown)  (no value)  (units    (unk

nown)



                    date)                                   unknown)  

 

           (unknown)  (no       (unknown)  (unknown)  22  (units    (unkno

wn)



                    date)                                   unknown)  

 

           (unknown)  (no       (unknown)  (unknown)  13:17     (units    (unkno

wn)



                    date)                                   unknown)  

 

           (unknown)  (no       (unknown)  (unknown)  [facility]  (units  

  (unknown)



                    date)                         Medicine  unknown)  

 

           (unknown)  (no       (unknown)  (unknown)  [place]  (units    (

unknown)



                    date)                         [address]     unknown)  

 

           (unknown)  (no       (unknown)  (unknown)  [employer]     (units    (unkno

wn)



                    date)                                   unknown)  

 

           (unknown)  (no       (unknown)  (unknown)  Family Practice  (units   

 (unknown)



                    date)                         Office Visit unknown)  

 

           (unknown)  (no       (unknown)  (unknown)  HIVES     (units    (unkno

wn)



                    date)                                   unknown)  

 

           (unknown)  (no       (unknown)  (unknown)  NAUSEA    (units    (unkno

wn)



                    date)                                   unknown)  

 

           (unknown)  (no       (unknown)  (unknown)  apply up to 4x  (units    

(unknown)



                    date)                         daily as needed 1 unknown)  



                                                  supp MT .prn 12 ea           



                                                  0RF                 

 

           (unknown)  (no       (unknown)  (unknown)  do not use for  (units    

(unknown)



                    date)                         more than 7 days 1 unknown)  



                                                  applic MT BID PRN           



                                                  30 grams 0RF           



                                                  hemorrhoids           

 

           (unknown)  (no       (unknown)  (unknown)  (no value)  (units    (unk

nown)



                    date)                                   unknown)  

 

           (unknown)  (no       (unknown)  (unknown)  #30 grams  (units    (unkn

own)



                    date)                         22 [Rx unknown)  



                                                  Confirmed           



                                                  22]           

 

           (unknown)  (no       (unknown)  (unknown)  22  (units    (unkno

wn)



                    date)                                   unknown)  

 

           (unknown)  (no       (unknown)  (unknown)  22 [Rx  (units    (u

nknown)



                    date)                         Confirmed unknown)  



                                                  22]           

 

           (unknown)  (no       (unknown)  (unknown)  1313      (units    (unkno

wn)



                    date)                                   unknown)  

 

           (unknown)  (no       (unknown)  (unknown)  [id number]    (units    (unkno

wn)



                    date)                                   unknown)  

 

           (unknown)  (no       (unknown)  (unknown)  Age/Sex: 52 / M  (units   

 (unknown)



                    date)                         Date of Service: unknown)  

 

           (unknown)  (no       (unknown)  (unknown)  Allergies  (units    (unkn

own)



                    date)                                   unknown)  

 

           (unknown)  (no       (unknown)  (unknown)  Assessment + Plan  (units 

   (unknown)



                    date)                                   unknown)  

 

           (unknown)  (no       (unknown)  (unknown)  Attending Dr:  (units    (

unknown)



                    date)                         Zhanna Polo unknown)  



                                                  [place]               

 

           (unknown)  (no       (unknown)  (unknown)  BMI 31.8  (units    (unkno

wn)



                    date)                                   unknown)  

 

           (unknown)  (no       (unknown)  (unknown)  /90  (units    (unkn

own)



                    date)                                   unknown)  

 

           (unknown)  (no       (unknown)  (unknown)  Blood Pressure  (units    

(unknown)



                    date)                         Location Lt unknown)  



                                                  brachial            

 

           (unknown)  (no       (unknown)  (unknown)  : 1970  (units   

 (unknown)



                    date)                         Acct:[id number] unknown)  

 

           (unknown)  (no       (unknown)  (unknown)  Dept at   (units    (unkno

wn)



                    date)                         [phone]. unknown)  

 

           (unknown)  (no       (unknown)  (unknown)  Documented By:  (units    

(unknown)



                    date)                         Zhanna Polo unknown)  



                                                  [place] 22           

 

           (unknown)  (no       (unknown)  (unknown)  Family History  (units    

(unknown)



                    date)                         (Reviewed 02/15/21 unknown)  



                                                  @ 18:17 by JAN Luevano)           

 

           (unknown)  (no       (unknown)  (unknown)  Father Lipids  (units    (

unknown)



                    date)                         abnormal  unknown)  

 

           (unknown)  (no       (unknown)  (unknown)  Height 175.26 cm  (units  

  (unknown)



                    date)                                   unknown)  

 

           (unknown)  (no       (unknown)  (unknown)  Intake    (units    (unkno

wn)



                    date)                                   unknown)  

 

           (unknown)  (no       (unknown)  (unknown)  Intake Note:  (units    (u

nknown)



                    date)                                   unknown)  

 

           (unknown)  (no       (unknown)  (unknown)  Intake performed  (units  

  (unknown)



                    date)                         by: Zia Miner unknown)  

 

           (unknown)  (no       (unknown)  (unknown)  Intake- Clincial  (units  

  (unknown)



                    date)                         Staff     unknown)  

 

           (unknown)  (no       (unknown)  (unknown)  Loc: [facility]  (units    (unkno

wn)



                    date)                                   unknown)  

 

           (unknown)  (no       (unknown)  (unknown)  Medical History  (units   

 (unknown)



                    date)                         (Reviewed 02/15/21 unknown)  



                                                  @ 18:17 by JAN Luevano)           

 

           (unknown)  (no       (unknown)  (unknown)  Medications  (units    (un

known)



                    date)                                   unknown)  

 

           (unknown)  (no       (unknown)  (unknown)  New       (units    (unkno

wn)



                    date)                                   unknown)  

 

           (unknown)  (no       (unknown)  (unknown)  No significant  (units    

(unknown)



                    date)                         medical problems unknown)  

 

           (unknown)  (no       (unknown)  (unknown)  Oxygen Delivery  (units   

 (unknown)



                    date)                         Method room air unknown)  

 

           (unknown)  (no       (unknown)  (unknown)  PFSH      (units    (unkno

wn)



                    date)                                   unknown)  

 

           (unknown)  (no       (unknown)  (unknown)  Patient:  (units    (unkno

wn)



                    date)                         Annita Jeong unknown)  



                                                  MR#: [id number]           

 

           (unknown)  (no       (unknown)  (unknown)  Position Standing  (units 

   (unknown)



                    date)                                   unknown)  

 

           (unknown)  (no       (unknown)  (unknown)  Pt presents with  (units  

  (unknown)



                    date)                         painful   unknown)  



                                                  hemorrhoids for           



                                                  about 2 weeks.           

 

           (unknown)  (no       (unknown)  (unknown)  Pulse 106 H  (units    (un

known)



                    date)                                   unknown)  

 

           (unknown)  (no       (unknown)  (unknown)  Pulse Oximetry  (units    

(unknown)



                    date)                         (%) 99    unknown)  

 

           (unknown)  (no       (unknown)  (unknown)  Pulse Source  (units    (u

nknown)



                    date)                         Monitor   unknown)  

 

           (unknown)  (no       (unknown)  (unknown)  Reason For Visit  (units  

  (unknown)



                    date)                                   unknown)  

 

           (unknown)  (no       (unknown)  (unknown)  Respiration 14  (units    

(unknown)



                    date)                                   unknown)  

 

           (unknown)  (no       (unknown)  (unknown)  Signed By:  (units    (unk

nown)



                    date)                                   unknown)  

 

           (unknown)  (no       (unknown)  (unknown)  Smoking Status:  (units   

 (unknown)



                    date)                         Never smoker unknown)  

 

           (unknown)  (no       (unknown)  (unknown)  Temp 96.9 F L  (units    (

unknown)



                    date)                                   unknown)  

 

           (unknown)  (no       (unknown)  (unknown)  Temp Source Skin  (units  

  (unknown)



                    date)                                   unknown)  

 

           (unknown)  (no       (unknown)  (unknown)  This note may  (units    (

unknown)



                    date)                         have been all or unknown)  



                                                  partially           



                                                  generated using           



                                                  voice recognition           

 

           (unknown)  (no       (unknown)  (unknown)  Tobacco +  (units    (unkn

own)



                    date)                         Substance Use unknown)  

 

           (unknown)  (no       (unknown)  (unknown)  Tobacco Status  (units    

(unknown)



                    date)                                   unknown)  

 

           (unknown)  (no       (unknown)  (unknown)  Visit Reasons:  (units    

(unknown)



                    date)                         Sore hemorrhoids unknown)  

 

           (unknown)  (no       (unknown)  (unknown)  Vitals    (units    (unkno

wn)



                    date)                                   unknown)  

 

           (unknown)  (no       (unknown)  (unknown)  Weight 97.976 kg  (units  

  (unknown)



                    date)                                   unknown)  

 

           (unknown)  (no       (unknown)  (unknown)  buspirone Allergy  (units 

   (unknown)



                    date)                         (Unknown, unknown)  



                                                  Unverified           



                                                  22 13:17)           

 

           (unknown)  (no       (unknown)  (unknown)  cyclobenzaprine  (units   

 (unknown)



                    date)                         Allergy (Mild, unknown)  



                                                  Unverified           



                                                  22 13:17)           

 

           (unknown)  (no       (unknown)  (unknown)  have occurred. If  (units 

   (unknown)



                    date)                         there are any unknown)  



                                                  questions, please           



                                                  contact the           



                                                  Medical Records           

 

           (unknown)  (no       (unknown)  (unknown)  hydrocortisone-pr  (units 

   (unknown)



                    date)                         amoxine 2.5 %-1 % unknown)  



                                                  rectal cream 1           



                                                  applic MT BID PRN           



                                                  hemorrhoids           

 

           (unknown)  (no       (unknown)  (unknown)  hydrocortisone-pr  (units 

   (unknown)



                    date)                         amoxine 2.5-1 % unknown)  

 

           (unknown)  (no       (unknown)  (unknown)  may occur.  (units    (unk

nown)



                    date)                         Occasional unknown)  



                                                  wrong-word or           



                                                  'sound-alike'           



                                                  substitutions may           



                                                  have                

 

           (unknown)  (no       (unknown)  (unknown)  occurred due to  (units   

 (unknown)



                    date)                         the inherent unknown)  



                                                  limitations of           



                                                  voice recognition           



                                                  software. Please           

 

           (unknown)  (no       (unknown)  (unknown)  penicillin G  (units    (u

nknown)



                    date)                         Allergy (Mild, unknown)  



                                                  Unverified           



                                                  22 13:17)           

 

           (unknown)  (no       (unknown)  (unknown)  phenylephrine  (units    (

unknown)



                    date)                         0.25 %-hard fat unknown)  



                                                  88.7 % rectal           



                                                  suppository 1 supp           



                                                  MT .prn #12 ea           

 

           (unknown)  (no       (unknown)  (unknown)  phenylephrine-pola  (units 

   (unknown)



                    date)                         d fat 0.25-88.7 % unknown)  

 

           (unknown)  (no       (unknown)  (unknown)  read the note  (units    (

unknown)



                    date)                         carefully and unknown)  



                                                  recognize, using           



                                                  context, where           



                                                  these               



                                                  substitutions           

 

           (unknown)  (no       (unknown)  (unknown)  software.  (units    (unkn

own)



                    date)                         Although every unknown)  



                                                  effort is made to           



                                                  edit content,           



                                                  transcription           



                                                  errors              









                                         Result panel 5









           (unknown)  (no       (unknown)  (unknown)  (no value)  (units    (unk

nown)



                    date)                                   unknown)  

 

           (unknown)  (no       (unknown)  (unknown)  Medications:  (units    (u

nknown)



                    date)                                   unknown)  

 

           (unknown)  (no       (unknown)  (unknown)  Qualifiers:  (units    (un

known)



                    date)                                   unknown)  

 

           (unknown)  (no       (unknown)  (unknown)  (no value)  (units    (unk

nown)



                    date)                                   unknown)  

 

           (unknown)  (no       (unknown)  (unknown)  (no value)  (units    (unk

nown)



                    date)                                   unknown)  

 

           (unknown)  (no       (unknown)  (unknown)  22  (units    (unkno

wn)



                    date)                                   unknown)  

 

           (unknown)  (no       (unknown)  (unknown)  22 1409  (units    (

unknown)



                    date)                                   unknown)  

 

           (unknown)  (no       (unknown)  (unknown)  13:17     (units    (unkno

wn)



                    date)                                   unknown)  

 

           (unknown)  (no       (unknown)  (unknown)  [facility]  (units  

  (unknown)



                    date)                         Medicine  unknown)  

 

           (unknown)  (no       (unknown)  (unknown)  [place]  (units    (

unknown)



                    date)                         [id number]     unknown)  

 

           (unknown)  (no       (unknown)  (unknown)  Family Practice  (units   

 (unknown)



                    date)                         Office Visit unknown)  

 

           (unknown)  (no       (unknown)  (unknown)  HIVES     (units    (unkno

wn)



                    date)                                   unknown)  

 

           (unknown)  (no       (unknown)  (unknown)  Hemorrhoid type:  (units  

  (unknown)



                    date)                         unspecified unknown)  



                                                  Qualified Code(s):           



                                                  K64.9 -             



                                                  Unspecified           

 

           (unknown)  (no       (unknown)  (unknown)  NAUSEA    (units    (unkno

wn)



                    date)                                   unknown)  

 

           (unknown)  (no       (unknown)  (unknown)  Signed    (units    (unkno

wn)



                    date)                                   unknown)  

 

           (unknown)  (no       (unknown)  (unknown)  apply up to 4x  (units    

(unknown)



                    date)                         daily as needed 1 unknown)  



                                                  supp MT .prn 12 ea           



                                                  0RF                 

 

           (unknown)  (no       (unknown)  (unknown)  do not use for  (units    

(unknown)



                    date)                         more than 7 days 1 unknown)  



                                                  applic MT BID PRN           



                                                  30 grams 0RF           



                                                  hemorrhoids           

 

           (unknown)  (no       (unknown)  (unknown)  (no value)  (units    (unk

nown)



                    date)                                   unknown)  

 

           (unknown)  (no       (unknown)  (unknown)  #30 grams  (units    (unkn

own)



                    date)                         22 [Rx unknown)  



                                                  Confirmed           



                                                  22]           

 

           (unknown)  (no       (unknown)  (unknown)  (1) Hemorrhoids:  (units  

  (unknown)



                    date)                                   unknown)  

 

           (unknown)  (no       (unknown)  (unknown)  22  (units    (unkno

wn)



                    date)                                   unknown)  

 

           (unknown)  (no       (unknown)  (unknown)  22 [Rx  (units    (u

nknown)



                    date)                         Confirmed unknown)  



                                                  22]           

 

           (unknown)  (no       (unknown)  (unknown)  1313      (units    (unkno

wn)



                    date)                                   unknown)  

 

           (unknown)  (no       (unknown)  (unknown)  [id number]    (units    (unkno

wn)



                    date)                                   unknown)  

 

           (unknown)  (no       (unknown)  (unknown)  Age/Sex: 52 / M  (units   

 (unknown)



                    date)                         Date of Service: unknown)  

 

           (unknown)  (no       (unknown)  (unknown)  Allergies  (units    (unkn

own)



                    date)                                   unknown)  

 

           (unknown)  (no       (unknown)  (unknown)  Assessment + Plan  (units 

   (unknown)



                    date)                                   unknown)  

 

           (unknown)  (no       (unknown)  (unknown)  Attending Dr:  (units    (

unknown)



                    date)                         Zhanna Polo unknown)  



                                                  P.A-C               

 

           (unknown)  (no       (unknown)  (unknown)  BMI 31.8  (units    (unkno

wn)



                    date)                                   unknown)  

 

           (unknown)  (no       (unknown)  (unknown)  /90  (units    (unkn

own)



                    date)                                   unknown)  

 

           (unknown)  (no       (unknown)  (unknown)  Blood Pressure  (units    

(unknown)



                    date)                         Location Lt unknown)  



                                                  brachial            

 

           (unknown)  (no       (unknown)  (unknown)  CARDIOVASCULAR:  (units   

 (unknown)



                    date)                         Denies chest pain, unknown)  



                                                  pressure,           



                                                  palpitations, or           



                                                  edema               

 

           (unknown)  (no       (unknown)  (unknown)  CARDIOVASCULAR:  (units   

 (unknown)



                    date)                         Regular rate and unknown)  



                                                  rhythm without           



                                                  murmurs, gallops,           



                                                  or rubs.            

 

           (unknown)  (no       (unknown)  (unknown)  Chief Complaint  (units   

 (unknown)



                    date)                                   unknown)  

 

           (unknown)  (no       (unknown)  (unknown)  Chief Complaint:  (units  

  (unknown)



                    date)                         painful   unknown)  



                                                  hemorrhoids           

 

           (unknown)  (no       (unknown)  (unknown)  : 1970  (units   

 (unknown)



                    date)                         Acct:[id number] unknown)  

 

           (unknown)  (no       (unknown)  (unknown)  Dept at   (units    (unkno

wn)



                    date)                         [phone]. unknown)  

 

           (unknown)  (no       (unknown)  (unknown)  Details:  (units    (unkno

wn)



                    date)                                   unknown)  

 

           (unknown)  (no       (unknown)  (unknown)  Documented By:  (units    

(unknown)



                    date)                         Zhanna Polo unknown)  



                                                  ANGELA 22           

 

           (unknown)  (no       (unknown)  (unknown)  Exam      (units    (unkno

wn)



                    date)                                   unknown)  

 

           (unknown)  (no       (unknown)  (unknown)  Exam Narrative  (units    

(unknown)



                    date)                                   unknown)  

 

           (unknown)  (no       (unknown)  (unknown)  Exam Narrative:  (units   

 (unknown)



                    date)                                   unknown)  

 

           (unknown)  (no       (unknown)  (unknown)  Family History  (units    

(unknown)



                    date)                         (Reviewed 22 unknown)  



                                                  @ 14:05 by Zhanna Polo PA-C)           

 

           (unknown)  (no       (unknown)  (unknown)  Father Lipids  (units    (

unknown)



                    date)                         abnormal  unknown)  

 

           (unknown)  (no       (unknown)  (unknown)  Findings and  (units    (u

nknown)



                    date)                         clinic/ER return unknown)  



                                                  precautions           



                                                  discussed with           



                                                  patient/family who           

 

           (unknown)  (no       (unknown)  (unknown)  GASTROINTESTINAL:  (units 

   (unknown)



                    date)                         See HPI   unknown)  

 

           (unknown)  (no       (unknown)  (unknown)  GASTROINTESTINAL:  (units 

   (unknown)



                    date)                         External  unknown)  



                                                  hemorrhoids           



                                                  visible. Internal           



                                                  hemorrhoids           



                                                  palpated            

 

           (unknown)  (no       (unknown)  (unknown)  GENERAL:  (units    (unkno

wn)



                    date)                         Well-developed unknown)  



                                                  patient, in mild           



                                                  distress.           

 

           (unknown)  (no       (unknown)  (unknown)  GENERAL: Denies  (units   

 (unknown)



                    date)                         fatigue, fever, or unknown)  



                                                  chills              

 

           (unknown)  (no       (unknown)  (unknown)  HEENT: Denies ear  (units 

   (unknown)



                    date)                         pain, vision unknown)  



                                                  changes, sore           



                                                  throat, or           



                                                  difficulty           



                                                  swallowing           

 

           (unknown)  (no       (unknown)  (unknown)  HEENT: PERRLA.  (units    

(unknown)



                    date)                         Airway patent. unknown)  

 

           (unknown)  (no       (unknown)  (unknown)  HPI       (units    (unkno

wn)



                    date)                                   unknown)  

 

           (unknown)  (no       (unknown)  (unknown)  HPI and exam  (units    (u

nknown)



                    date)                         suggestive unknown)  



                                                  hemorrhoids.           



                                                  Rectal suppository           



                                                  prescribed as well           



                                                  as                  

 

           (unknown)  (no       (unknown)  (unknown)  He also recently  (units  

  (unknown)



                    date)                         just bought a unknown)  



                                                  donut seat pillow           



                                                  for his car.           

 

           (unknown)  (no       (unknown)  (unknown)  Height 175.26 cm  (units  

  (unknown)



                    date)                                   unknown)  

 

           (unknown)  (no       (unknown)  (unknown)  Intake    (units    (unkno

wn)



                    date)                                   unknown)  

 

           (unknown)  (no       (unknown)  (unknown)  Intake Note:  (units    (u

nknown)



                    date)                                   unknown)  

 

           (unknown)  (no       (unknown)  (unknown)  Intake performed  (units  

  (unknown)



                    date)                         by: Zia Miner unknown)  

 

           (unknown)  (no       (unknown)  (unknown)  Intake- Clincial  (units  

  (unknown)



                    date)                         Staff     unknown)  

 

           (unknown)  (no       (unknown)  (unknown)  Loc: [facility]  (units    (unkno

wn)



                    date)                                   unknown)  

 

           (unknown)  (no       (unknown)  (unknown)  Medical History  (units   

 (unknown)



                    date)                         (Reviewed 22 unknown)  



                                                  @ 14:05 by Zhanna Polo PA-C)           

 

           (unknown)  (no       (unknown)  (unknown)  Medications  (units    (un

known)



                    date)                                   unknown)  

 

           (unknown)  (no       (unknown)  (unknown)  New       (units    (unkno

wn)



                    date)                                   unknown)  

 

           (unknown)  (no       (unknown)  (unknown)  No significant  (units    

(unknown)



                    date)                         medical problems unknown)  

 

           (unknown)  (no       (unknown)  (unknown)  Oxygen Delivery  (units   

 (unknown)



                    date)                         Method room air unknown)  

 

           (unknown)  (no       (unknown)  (unknown)  PFSH      (units    (unkno

wn)



                    date)                                   unknown)  

 

           (unknown)  (no       (unknown)  (unknown)  Patient is a  (units    (u

nknown)



                    date)                         52-year-old male unknown)  



                                                  presenting with           



                                                  complaints of           



                                                  painful             



                                                  hemorrhoids           

 

           (unknown)  (no       (unknown)  (unknown)  Patient:  (units    (unkno

wn)



                    date)                         Annita Jeong unknown)  



                                                  MR#: [id number]           

 

           (unknown)  (no       (unknown)  (unknown)  Plan      (units    (unkno

wn)



                    date)                                   unknown)  

 

           (unknown)  (no       (unknown)  (unknown)  Position Standing  (units 

   (unknown)



                    date)                                   unknown)  

 

           (unknown)  (no       (unknown)  (unknown)  Pt presents with  (units  

  (unknown)



                    date)                         painful   unknown)  



                                                  hemorrhoids for           



                                                  about 2 weeks.           

 

           (unknown)  (no       (unknown)  (unknown)  Pulse 106 H  (units    (un

known)



                    date)                                   unknown)  

 

           (unknown)  (no       (unknown)  (unknown)  Pulse Oximetry  (units    

(unknown)



                    date)                         (%) 99    unknown)  

 

           (unknown)  (no       (unknown)  (unknown)  Pulse Source  (units    (u

nknown)



                    date)                         Monitor   unknown)  

 

           (unknown)  (no       (unknown)  (unknown) RESPIRATORY: Clear  (units 

   (unknown)



                    date)                         to auscultation. unknown)  



                                                  Breath sounds           



                                                  equal bilaterally.           



                                                  No wheezes,           

 

           (unknown)  (no       (unknown)  (unknown)  RESPIRATORY:  (units    (u

nknown)



                    date)                         Denies shortness unknown)  



                                                  of breath, cough,           



                                                  or wheezing           

 

           (unknown)  (no       (unknown)  (unknown)  ROS       (units    (unkno

wn)



                    date)                                   unknown)  

 

           (unknown)  (no       (unknown)  (unknown)  ROS Narrative  (units    (

unknown)



                    date)                                   unknown)  

 

           (unknown)  (no       (unknown)  (unknown)  ROS Narrative:  (units    

(unknown)



                    date)                                   unknown)  

 

           (unknown)  (no       (unknown)  (unknown)  Reason For Visit  (units  

  (unknown)



                    date)                                   unknown)  

 

           (unknown)  (no       (unknown)  (unknown)  Respiration 14  (units    

(unknown)



                    date)                                   unknown)  

 

           (unknown)  (no       (unknown)  (unknown)  Signed By:  (units    (unk

nown)



                    date)                         <Electronically unknown)  



                                                  signed by Zhanna Polo>           

 

           (unknown)  (no       (unknown)  (unknown)  Smoking Status:  (units   

 (unknown)



                    date)                         Never smoker unknown)  

 

           (unknown)  (no       (unknown)  (unknown)  Temp 96.9 F L  (units    (

unknown)



                    date)                                   unknown)  

 

           (unknown)  (no       (unknown)  (unknown)  Temp Source Skin  (units  

  (unknown)



                    date)                                   unknown)  

 

           (unknown)  (no       (unknown)  (unknown)  This note may  (units    (

unknown)



                    date)                         have been all or unknown)  



                                                  partially           



                                                  generated using           



                                                  voice recognition           

 

           (unknown)  (no       (unknown)  (unknown)  Tobacco +  (units    (unkn

own)



                    date)                         Substance Use unknown)  

 

           (unknown)  (no       (unknown)  (unknown)  Tobacco Status  (units    

(unknown)



                    date)                                   unknown)  

 

           (unknown)  (no       (unknown)  (unknown)  Visit Reasons:  (units    

(unknown)



                    date)                         Sore hemorrhoids unknown)  

 

           (unknown)  (no       (unknown)  (unknown)  Vitals    (units    (unkno

wn)



                    date)                                   unknown)  

 

           (unknown)  (no       (unknown)  (unknown)  Weight 97.976 kg  (units  

  (unknown)



                    date)                                   unknown)  

 

           (unknown)  (no       (unknown)  (unknown)  and limiting  (units    (u

nknown)



                    date)                         abdominal unknown)  



                                                  straining whenever           



                                                  possible. Follow           



                                                  up with primary if           

 

           (unknown)  (no       (unknown)  (unknown)  applying topical  (units  

  (unknown)



                    date)                         preparation H with unknown)  



                                                  limited relief. He           



                                                  has been taking           



                                                  MiraLax             

 

           (unknown)  (no       (unknown)  (unknown)  buspirone Allergy  (units 

   (unknown)



                    date)                         (Unknown, unknown)  



                                                  Unverified           



                                                  22 13:17)           

 

           (unknown)  (no       (unknown)  (unknown)  cyclobenzaprine  (units   

 (unknown)



                    date)                         Allergy (Mild, unknown)  



                                                  Unverified           



                                                  22 13:17)           

 

           (unknown)  (no       (unknown)  (unknown)  daily and reports  (units 

   (unknown)



                    date)                         his stools are unknown)  



                                                  increasingly           



                                                  softer and less           



                                                  painful to pass.           

 

           (unknown)  (no       (unknown)  (unknown)  days straight.  (units    

(unknown)



                    date)                         Encouraged unknown)  



                                                  continuing           



                                                  MiraLax, utilizing           



                                                  his donut seat           



                                                  pillow,             

 

           (unknown)  (no       (unknown)  (unknown)  food delivery and  (units 

   (unknown)



                    date)                         reports   unknown)  



                                                  hemorrhoids are           



                                                  worse after being           



                                                  seated in the car           

 

           (unknown)  (no       (unknown)  (unknown)  for long periods  (units  

  (unknown)



                    date)                         of time. He unknown)  



                                                  additionally           



                                                  reports occasional           



                                                  blood on his           

 

           (unknown)  (no       (unknown)  (unknown)  for the past 2  (units    

(unknown)



                    date)                         weeks. He reports unknown)  



                                                  a chronic history           



                                                  of hemorrhoids. He           



                                                  does                

 

           (unknown)  (no       (unknown)  (unknown)  have occurred. If  (units 

   (unknown)



                    date)                         there are any unknown)  



                                                  questions, please           



                                                  contact the           



                                                  Medical Records           

 

           (unknown)  (no       (unknown)  (unknown)  hemorrhoids  (units    (un

known)



                    date)                                   unknown)  

 

           (unknown)  (no       (unknown)  (unknown)  hydrocortisone-pr  (units 

   (unknown)



                    date)                         amoxine 2.5 %-1 % unknown)  



                                                  rectal cream 1           



                                                  applic MT BID PRN           



                                                  hemorrhoids           

 

           (unknown)  (no       (unknown)  (unknown)  hydrocortisone-pr  (units 

   (unknown)



                    date)                         amoxine 2.5-1 % unknown)  

 

           (unknown)  (no       (unknown)  (unknown)  may occur.  (units    (unk

nown)



                    date)                         Occasional unknown)  



                                                  wrong-word or           



                                                  'sound-alike'           



                                                  substitutions may           



                                                  have                

 

           (unknown)  (no       (unknown)  (unknown)  occurred due to  (units   

 (unknown)



                    date)                         the inherent unknown)  



                                                  limitations of           



                                                  voice recognition           



                                                  software. Please           

 

           (unknown)  (no       (unknown)  (unknown)  on HAY. No sign  (units   

 (unknown)



                    date)                         of thrombosed or unknown)  



                                                  prolapsed           



                                                  hemorrhoids.           

 

           (unknown)  (no       (unknown)  (unknown)  penicillin G  (units    (u

nknown)



                    date)                         Allergy (Mild, unknown)  



                                                  Unverified           



                                                  22 13:17)           

 

           (unknown)  (no       (unknown)  (unknown)  phenylephrine  (units    (

unknown)



                    date)                         0.25 %-hard fat unknown)  



                                                  88.7 % rectal           



                                                  suppository 1 supp           



                                                  MT .prn #12 ea           

 

           (unknown)  (no       (unknown)  (unknown)  phenylephrine-pola  (units 

   (unknown)



                    date)                         d fat 0.25-88.7 % unknown)  

 

           (unknown)  (no       (unknown)  (unknown)  rales, or  (units    (unkn

own)



                    date)                         rhonchi.  unknown)  

 

           (unknown)  (no       (unknown)  (unknown)  read the note  (units    (

unknown)



                    date)                         carefully and unknown)  



                                                  recognize, using           



                                                  context, where           



                                                  these               



                                                  substitutions           

 

           (unknown)  (no       (unknown)  (unknown)  software.  (units    (unkn

own)



                    date)                         Although every unknown)  



                                                  effort is made to           



                                                  edit content,           



                                                  transcription           



                                                  errors              

 

           (unknown)  (no       (unknown)  (unknown)  stool. He denies  (units  

  (unknown)



                    date)                         any abdominal unknown)  



                                                  pain, fevers,           



                                                  nausea or           



                                                  vomiting. He has           



                                                  been                

 

           (unknown)  (no       (unknown)  (unknown)  symptoms persist.  (units 

   (unknown)



                    date)                         Possible  unknown)  



                                                  colorectal           



                                                  referral if           



                                                  symptoms do not           



                                                  improve.            

 

           (unknown)  (no       (unknown)  (unknown)  topical cream per  (units 

   (unknown)



                    date)                         patient request. unknown)  



                                                  He should not use           



                                                  the cream for more           



                                                  than 7              

 

           (unknown)  (no       (unknown)  (unknown)  verbalized  (units    (unk

nown)



                    date)                         understanding. unknown)  







Social History







                     date                description         facility

 

                     (no date)           Never smoked tobacco ([facility]







Vital Signs







                 date            measurement     value           units









              +0000  BMI          BMI          31.8         kg/m2

 

              [id number]+0000  BP_diastolic  BP_diastolic  90           mm[H

g]

 

              [id number]+0000  BP_systolic  BP_systolic  136          mm[Hg]

 

              25950517293034+0000  heart_rate   heart_rate   106          /min

 

              [id number]+0000  height_metric  height_metric  175.26       cm

 

              [id number]+0000  height_standard  height_standard  69         

  in

 

              41736675003349+0000  respiration_rate  respiration_rate  14       

    /min

 

              34661614380812+0000  temperature_metric  temperature_metric  36.06

        C

 

              34947259871344+0000  temperature_standard  temperature_standard  9

6.9         F

 

              82664277164364+0000  weight_metric  weight_metric  97.97        kg

 

              83617315486441+0000  weight_standard  weight_standard  215.99     

  lb

 

              58105039451068+0000  BMI          BMI          27.42        kg/m2

 

              84123612834386+0000  BP_diastolic  BP_diastolic  95           mm[H

g]

 

              87479804344907+0000  BP_systolic  BP_systolic  151          mm[Hg]

 

              05925000241576+0000  heart_rate   heart_rate   108          /min

 

              23475466620282+0000  height_metric  height_metric  175.26       cm

 

              62060890893772+0000  height_standard  height_standard  69         

  in

 

              46625256574212+0000  respiration_rate  respiration_rate  16       

    /min

 

              91861577699059+0000  temperature_metric  temperature_metric  38.39

        C

 

              35359847742565+0000  temperature_standard  temperature_standard  1

01.1        F

 

              56261709711634+0000  weight_metric  weight_metric  83.91        kg

 

              89947950072036+0000  weight_standard  weight_standard  185        

  lb

## 2022-10-11 NOTE — ED PHYSICIAN DOCUMENTATION
PD HPI BACK PAIN





- Stated complaint


Stated Complaint: RT HIP PX





- Chief complaint


Chief Complaint: Back Pain





- History obtained from


History obtained from: Patient





- Additional information


Additional information: 





52-year-old gentleman has had right low back pain radiating to the right hip for

the last 5 days.  No specific injury.  Its much better if he is standing or 

having his back and hips flexed.  It is worse with position changes and laying 

flat.  There is no associated weakness, numbness, tingling in the legs.  No 

saddle anesthesia or incontinence.  No fevers.





Review of Systems


Constitutional: denies: Fever, Chills


Eyes: reports: Reviewed and negative


Cardiac: reports: Reviewed and negative


Respiratory: reports: Reviewed and negative





PD PAST MEDICAL HISTORY





- Past Medical History


Past Medical History: Yes


Cardiovascular: High cholesterol


Respiratory: None


Neuro: None


Endocrine/Autoimmune: None


GI: None


: None


HEENT: None


Psych: None


Musculoskeletal: None


Derm: None





- Past Surgical History


Past Surgical History: Yes


General: Other





- Present Medications


Home Medications: 


                                Ambulatory Orders











 Medication  Instructions  Recorded  Confirmed


 


Cyclobenzaprine [Flexeril] 10 mg PO TID PRN #20 tablet 10/11/22 


 


HYDROcod/ACETAM 5/325 [Norco 5/325] 1 - 2 tab PO Q6H PRN #15 tablet 10/11/22 


 


Ibuprofen [Motrin] 800 mg PO Q8H PRN #30 tablet 10/11/22 














- Allergies


Allergies/Adverse Reactions: 


                                    Allergies











Allergy/AdvReac Type Severity Reaction Status Date / Time


 


Penicillins Allergy Intermediate Rash Verified 10/11/22 08:46


 


buspirone HCl * [From BuSpar] AdvReac Severe Emesis Verified 10/11/22 08:46


 


cephalexin monohydrate * AdvReac Severe Emesis Verified 10/11/22 08:46





[From Keflex]     














- Social History


Does the pt smoke?: No


Smoking Status: Never smoker


Does the pt drink ETOH?: No


Does the pt have substance abuse?: No





- Immunizations


Immunizations are current?: No


Immunizations: Other immun not current





- POLST


Patient has POLST: No





PD ED PE NORMAL





- Vitals


Vital signs reviewed: Yes





- General


General: Alert and oriented X 3, No acute distress





- Abdomen


Abdomen: Normal bowel sounds, Soft, Non tender





- Back


Back: Other (Muscular tenderness in the right sciatic notch and low right par

alumbar area)





- Extremities


Extremities: Other (The patient has equal and normal Achilles and patellar 

reflexes bilaterally.  Normal sensation in all areas of the legs.  Patient 

denies saddle anesthesia.  Normal strength in flexion-extension at the ankles, 

knees, and flexion of the hips.)





- Neuro


Neuro: Alert and oriented X 3, Normal speech





Results





- Vitals


Vitals: 





                               Vital Signs - 24 hr











  10/11/22 10/11/22





  08:47 08:57


 


Temperature 36.2 C L 


 


Heart Rate 95 82


 


Respiratory 16 16





Rate  


 


Blood Pressure 145/98 H 156/96 H


 


O2 Saturation 100 100








                                     Oxygen











O2 Source                      Room air

















PD MEDICAL DECISION MAKING





- ED course


ED course: 





This patient has seemingly uncomplicated musculoskeletal back pain.  The patient

has no "red flags."  Specifically denies IV drug use, fevers, incontinence, 

saddle anesthesia.  Spinal epidural abscess was considered, given that the 

patient has no fever, is not diabetic, has no spinal tenderness, does not use IV

drugs, and has no bilateral neurologic symptoms, the diagnosis of spinal 

epidural abscess is considered exceedingly unlikely.





I am prescribing a short course of short-acting opioid pain medication for this 

patient. I have reviewed the patients  and no concerning findings were 

noted. I have discussed that the opioids are for short term therapy only, and 

will not be refilled from the ED.





Departure





- Departure


Disposition: 01 Home, Self Care


Clinical Impression: 


 Back pain, Sciatica





Condition: Good


Record reviewed to determine appropriate education?: Yes


Instructions:  ED Spasm Back No Trauma, ED Sciatica


Prescriptions: 


Cyclobenzaprine [Flexeril] 10 mg PO TID PRN #20 tablet


 PRN Reason: Spasms


Ibuprofen [Motrin] 800 mg PO Q8H PRN #30 tablet


 PRN Reason: PAIN &/OR FEVER


HYDROcod/ACETAM 5/325 [Norco 5/325] 1 - 2 tab PO Q6H PRN #15 tablet


 PRN Reason: Pain


Comments: 


I sent your prescriptions electronically to [facility] in [place].





Call your doctor to arrange a follow-up appointment, make the next available 

appointment.  In the interim, return anytime if worse or if new symptoms 

develop.





I am prescribing a short course of narcotic pain medication for you. These are 

potentially dangerous and addictive medications that should be used carefully.


These medications may constipate you. Take an over-the-counter stool softener 

(docusate) twice daily with plenty of water while taking these medications. If 

you go 24 hours without a bowel movement, take over-the-counter miralax, per 

package instructions.


Do not drink or drive while taking these medications.


If you received narcotic or sedating medications while in the emergency 

department, do not drive for 24 hours.


Store this medication in a safe, secure place and out of reach of children.


It is a violation of federal law to give or sell this medication to another 

person or to use in a manner other than prescribed.


The ED will not refill narcotic prescriptions, including prescriptions lost or 

stolen.


To dispose of unwanted medications:


1. [facility] [address] in 

[place] has a medication drop box. They accept prescription medications (in 

pill form) Monday through Friday 9:00 a.m. to 5:00 p.m.


2. [facility] [employer] [facility] accepts prescription medications (in

pill form only) for disposal year round. Call [phone] for more 

information.


3. Contact the [facility] for the next [facility] sponsored prescription 

drug collection event. [phone], [phone], or [phone] 

[phone];


Note that many narcotic pain relievers also contain Tylenol/acetaminophen.  

Please ensure that your total dose of acetaminophen from all sources does not 

exceed 3 g (3000 mg) per day.





Forms:  Activity restrictions

## 2023-06-29 ENCOUNTER — HOSPITAL ENCOUNTER (OUTPATIENT)
Dept: HOSPITAL 76 - LAB.N | Age: 53
Discharge: HOME | End: 2023-06-29
Attending: PHYSICIAN ASSISTANT
Payer: MEDICAID

## 2023-06-29 DIAGNOSIS — R73.01: Primary | ICD-10-CM

## 2023-06-29 DIAGNOSIS — Z13.9: ICD-10-CM

## 2023-06-29 LAB
ALBUMIN DIAFP-MCNC: 4.7 G/DL (ref 3.2–5.5)
ALBUMIN/GLOB SERPL: 1.5 {RATIO} (ref 1–2.2)
ALP SERPL-CCNC: 108 IU/L (ref 42–121)
ALT SERPL W P-5'-P-CCNC: 147 IU/L (ref 10–60)
ANION GAP SERPL CALCULATED.4IONS-SCNC: 7 MMOL/L (ref 6–13)
AST SERPL W P-5'-P-CCNC: 68 IU/L (ref 10–42)
BASOPHILS NFR BLD AUTO: 0.1 10^3/UL (ref 0–0.1)
BASOPHILS NFR BLD AUTO: 1 %
BILIRUB BLD-MCNC: 0.8 MG/DL (ref 0.2–1)
BUN SERPL-MCNC: 18 MG/DL (ref 6–20)
CALCIUM UR-MCNC: 9.8 MG/DL (ref 8.5–10.3)
CHLORIDE SERPL-SCNC: 106 MMOL/L (ref 101–111)
CHOLEST SERPL-MCNC: 228 MG/DL
CO2 SERPL-SCNC: 27 MMOL/L (ref 21–32)
CREAT SERPLBLD-SCNC: 1 MG/DL (ref 0.6–1.2)
EOSINOPHIL # BLD AUTO: 0.1 10^3/UL (ref 0–0.7)
EOSINOPHIL NFR BLD AUTO: 1.7 %
ERYTHROCYTE [DISTWIDTH] IN BLOOD BY AUTOMATED COUNT: 12.6 % (ref 12–15)
GFRSERPLBLD MDRD-ARVRAT: 78 ML/MIN/{1.73_M2} (ref 89–?)
GLOBULIN SER-MCNC: 3.1 G/DL (ref 2.1–4.2)
GLUCOSE SERPL-MCNC: 113 MG/DL (ref 70–100)
HCT VFR BLD AUTO: 49.2 % (ref 42–52)
HDLC SERPL-MCNC: 43 MG/DL
HDLC SERPL: 5.3 {RATIO} (ref ?–5)
HGB UR QL STRIP: 16.9 G/DL (ref 14–18)
LDLC SERPL CALC-MCNC: 150 MG/DL
LDLC/HDLC SERPL: 3.5 {RATIO} (ref ?–3.6)
LYMPHOCYTES # SPEC AUTO: 1.7 10^3/UL (ref 1.5–3.5)
LYMPHOCYTES NFR BLD AUTO: 20 %
MCH RBC QN AUTO: 31 PG (ref 27–31)
MCHC RBC AUTO-ENTMCNC: 34.3 G/DL (ref 32–36)
MCV RBC AUTO: 90.1 FL (ref 80–94)
MONOCYTES # BLD AUTO: 0.7 10^3/UL (ref 0–1)
MONOCYTES NFR BLD AUTO: 8.8 %
NEUTROPHILS # BLD AUTO: 5.7 10^3/UL (ref 1.5–6.6)
NEUTROPHILS # SNV AUTO: 8.4 X10^3/UL (ref 4.8–10.8)
NEUTROPHILS NFR BLD AUTO: 67.7 %
NRBC # BLD AUTO: 0 /100WBC
NRBC # BLD AUTO: 0 X10^3/UL
PDW BLD AUTO: 10.4 FL (ref 7.4–11.4)
PLATELET # BLD: 290 10^3/UL (ref 130–450)
POTASSIUM SERPL-SCNC: 4.1 MMOL/L (ref 3.5–5)
PROT SPEC-MCNC: 7.8 G/DL (ref 6.7–8.2)
RBC MAR: 5.46 10^6/UL (ref 4.7–6.1)
SODIUM SERPLBLD-SCNC: 140 MMOL/L (ref 135–145)
TRIGL P FAST SERPL-MCNC: 174 MG/DL
TSH SERPL-ACNC: 1.81 UIU/ML (ref 0.34–5.6)
VLDLC SERPL-SCNC: 35 MG/DL

## 2023-06-29 PROCEDURE — 80053 COMPREHEN METABOLIC PANEL: CPT

## 2023-06-29 PROCEDURE — 83721 ASSAY OF BLOOD LIPOPROTEIN: CPT

## 2023-06-29 PROCEDURE — 85025 COMPLETE CBC W/AUTO DIFF WBC: CPT

## 2023-06-29 PROCEDURE — 83036 HEMOGLOBIN GLYCOSYLATED A1C: CPT

## 2023-06-29 PROCEDURE — 80061 LIPID PANEL: CPT

## 2023-06-29 PROCEDURE — 84443 ASSAY THYROID STIM HORMONE: CPT

## 2023-06-29 PROCEDURE — 36415 COLL VENOUS BLD VENIPUNCTURE: CPT

## 2023-06-30 LAB
EST. AVERAGE GLUCOSE BLD GHB EST-MCNC: 105 MG/DL (ref 70–100)
HBA1C MFR BLD HPLC: 5.3 % (ref 4.27–6.07)